# Patient Record
Sex: FEMALE | Race: WHITE | NOT HISPANIC OR LATINO | Employment: UNEMPLOYED | URBAN - NONMETROPOLITAN AREA
[De-identification: names, ages, dates, MRNs, and addresses within clinical notes are randomized per-mention and may not be internally consistent; named-entity substitution may affect disease eponyms.]

---

## 2017-05-18 VITALS
WEIGHT: 228 LBS | HEIGHT: 64 IN | SYSTOLIC BLOOD PRESSURE: 124 MMHG | BODY MASS INDEX: 38.93 KG/M2 | DIASTOLIC BLOOD PRESSURE: 68 MMHG

## 2017-05-24 ENCOUNTER — INITIAL PRENATAL (OUTPATIENT)
Dept: OBSTETRICS AND GYNECOLOGY | Facility: CLINIC | Age: 32
End: 2017-05-24

## 2017-05-24 VITALS — BODY MASS INDEX: 39.48 KG/M2 | WEIGHT: 230 LBS | DIASTOLIC BLOOD PRESSURE: 64 MMHG | SYSTOLIC BLOOD PRESSURE: 126 MMHG

## 2017-05-24 DIAGNOSIS — Z34.90 PREGNANCY, UNSPECIFIED GESTATIONAL AGE: Primary | ICD-10-CM

## 2017-05-24 PROBLEM — I10 CHRONIC HYPERTENSION: Status: ACTIVE | Noted: 2017-05-24

## 2017-05-24 PROBLEM — I10 CHRONIC HYPERTENSION: Status: RESOLVED | Noted: 2017-05-24 | Resolved: 2017-05-24

## 2017-05-24 PROCEDURE — 99204 OFFICE O/P NEW MOD 45 MIN: CPT | Performed by: NURSE PRACTITIONER

## 2017-05-24 RX ORDER — TIZANIDINE 4 MG/1
4 TABLET ORAL NIGHTLY PRN
COMMUNITY
End: 2017-07-03

## 2017-05-24 RX ORDER — BUPRENORPHINE HYDROCHLORIDE AND NALOXONE HYDROCHLORIDE DIHYDRATE 8; 2 MG/1; MG/1
TABLET SUBLINGUAL
COMMUNITY
Start: 2016-02-22 | End: 2017-06-05 | Stop reason: ALTCHOICE

## 2017-05-24 RX ORDER — PRENATAL WITH FERROUS FUM AND FOLIC ACID 3080; 920; 120; 400; 22; 1.84; 3; 20; 10; 1; 12; 200; 27; 25; 2 [IU]/1; [IU]/1; MG/1; [IU]/1; MG/1; MG/1; MG/1; MG/1; MG/1; MG/1; UG/1; MG/1; MG/1; MG/1; MG/1
1 TABLET ORAL DAILY
Qty: 30 TABLET | Refills: 4 | Status: SHIPPED | OUTPATIENT
Start: 2017-05-24 | End: 2017-06-23

## 2017-05-24 RX ORDER — LAMOTRIGINE 100 MG/1
1.5 TABLET ORAL DAILY
COMMUNITY
Start: 2016-01-07 | End: 2017-07-03

## 2017-05-24 RX ORDER — PANTOPRAZOLE SODIUM 40 MG/1
TABLET, DELAYED RELEASE ORAL
COMMUNITY
Start: 2016-02-23 | End: 2017-06-05 | Stop reason: ALTCHOICE

## 2017-05-24 RX ORDER — QUETIAPINE FUMARATE 100 MG/1
TABLET, FILM COATED ORAL DAILY
COMMUNITY
Start: 2016-01-07

## 2017-05-24 RX ORDER — GABAPENTIN 600 MG/1
TABLET ORAL 4 TIMES DAILY
COMMUNITY
Start: 2016-02-09 | End: 2017-06-05

## 2017-05-24 RX ORDER — CLONIDINE HYDROCHLORIDE 0.2 MG/1
TABLET ORAL 2 TIMES DAILY
COMMUNITY
Start: 2015-12-18 | End: 2017-07-03

## 2017-05-24 RX ORDER — RANITIDINE 150 MG/1
150 TABLET ORAL 2 TIMES DAILY
Qty: 60 TABLET | Refills: 2 | Status: SHIPPED | OUTPATIENT
Start: 2017-05-24 | End: 2017-06-19 | Stop reason: SDUPTHER

## 2017-05-24 RX ORDER — LISINOPRIL 20 MG/1
20 TABLET ORAL DAILY
COMMUNITY
End: 2017-06-05

## 2017-05-24 RX ORDER — PROMETHAZINE HYDROCHLORIDE 25 MG/1
TABLET ORAL
COMMUNITY
Start: 2016-01-07 | End: 2017-06-05 | Stop reason: SDUPTHER

## 2017-05-25 PROBLEM — R76.8 HEPATITIS C ANTIBODY TEST POSITIVE: Status: ACTIVE | Noted: 2017-05-25

## 2017-05-25 LAB
ABO GROUP BLD: (no result)
ALBUMIN SERPL-MCNC: 4.5 G/DL (ref 3.5–5)
ALP SERPL-CCNC: 47 U/L (ref 38–126)
ALT SERPL-CCNC: 21 U/L (ref 13–69)
AST SERPL-CCNC: 18 U/L (ref 15–46)
BASOPHILS # BLD AUTO: 0 X10E3/UL (ref 0–0.2)
BASOPHILS NFR BLD AUTO: 0 %
BILIRUB DIRECT SERPL-MCNC: 0.4 MG/DL (ref 0–0.4)
BILIRUB SERPL-MCNC: 0.5 MG/DL (ref 0.2–1.3)
BLD GP AB SCN SERPL QL: NEGATIVE
EOSINOPHIL # BLD AUTO: 0.1 X10E3/UL (ref 0–0.4)
EOSINOPHIL NFR BLD AUTO: 1 %
ERYTHROCYTE [DISTWIDTH] IN BLOOD BY AUTOMATED COUNT: 15.6 % (ref 12.3–15.4)
HBV SURFACE AG SERPL QL IA: NEGATIVE
HCT VFR BLD AUTO: 38.3 % (ref 34–46.6)
HCV AB S/CO SERPL IA: 8.7 S/CO RATIO (ref 0–0.9)
HGB BLD-MCNC: 12.3 G/DL (ref 11.1–15.9)
HIV 1+2 AB+HIV1 P24 AG SERPL QL IA: NON REACTIVE
IMM GRANULOCYTES # BLD: 0 X10E3/UL (ref 0–0.1)
IMM GRANULOCYTES NFR BLD: 0 %
LYMPHOCYTES # BLD AUTO: 3.1 X10E3/UL (ref 0.7–3.1)
LYMPHOCYTES NFR BLD AUTO: 28 %
MCH RBC QN AUTO: 28.6 PG (ref 26.6–33)
MCHC RBC AUTO-ENTMCNC: 32.1 G/DL (ref 31.5–35.7)
MCV RBC AUTO: 89 FL (ref 79–97)
MONOCYTES # BLD AUTO: 0.6 X10E3/UL (ref 0.1–0.9)
MONOCYTES NFR BLD AUTO: 5 %
NEUTROPHILS # BLD AUTO: 7.5 X10E3/UL (ref 1.4–7)
NEUTROPHILS NFR BLD AUTO: 66 %
PLATELET # BLD AUTO: 273 X10E3/UL (ref 150–379)
PROT SERPL-MCNC: 7.5 G/DL (ref 6.3–8.2)
RBC # BLD AUTO: 4.3 X10E6/UL (ref 3.77–5.28)
RH BLD: NEGATIVE
RPR SER QL: NON REACTIVE
RUBV IGG SERPL IA-ACNC: 1.85 INDEX
WBC # BLD AUTO: 11.3 X10E3/UL (ref 3.4–10.8)

## 2017-06-05 ENCOUNTER — ROUTINE PRENATAL (OUTPATIENT)
Dept: OBSTETRICS AND GYNECOLOGY | Facility: CLINIC | Age: 32
End: 2017-06-05

## 2017-06-05 VITALS — WEIGHT: 236 LBS | SYSTOLIC BLOOD PRESSURE: 128 MMHG | DIASTOLIC BLOOD PRESSURE: 70 MMHG | BODY MASS INDEX: 40.51 KG/M2

## 2017-06-05 DIAGNOSIS — Z3A.10 10 WEEKS GESTATION OF PREGNANCY: Primary | ICD-10-CM

## 2017-06-05 DIAGNOSIS — K21.9 GASTROESOPHAGEAL REFLUX DISEASE WITHOUT ESOPHAGITIS: ICD-10-CM

## 2017-06-05 DIAGNOSIS — O99.321 SUBSTANCE ABUSE AFFECTING PREGNANCY IN FIRST TRIMESTER, ANTEPARTUM: ICD-10-CM

## 2017-06-05 DIAGNOSIS — O16.1 HYPERTENSION AFFECTING PREGNANCY IN FIRST TRIMESTER: ICD-10-CM

## 2017-06-05 DIAGNOSIS — F31.9 BIPOLAR DISEASE DURING PREGNANCY IN FIRST TRIMESTER (HCC): ICD-10-CM

## 2017-06-05 DIAGNOSIS — R76.8 HEPATITIS C ANTIBODY TEST POSITIVE: ICD-10-CM

## 2017-06-05 DIAGNOSIS — O99.341 BIPOLAR DISEASE DURING PREGNANCY IN FIRST TRIMESTER (HCC): ICD-10-CM

## 2017-06-05 PROCEDURE — 99213 OFFICE O/P EST LOW 20 MIN: CPT | Performed by: OBSTETRICS & GYNECOLOGY

## 2017-06-05 RX ORDER — PROMETHAZINE HYDROCHLORIDE 25 MG/1
25 TABLET ORAL EVERY 6 HOURS PRN
Qty: 30 TABLET | Refills: 0 | Status: SHIPPED | OUTPATIENT
Start: 2017-06-05

## 2017-06-05 RX ORDER — BUPRENORPHINE HYDROCHLORIDE 8 MG/1
16 TABLET SUBLINGUAL DAILY
COMMUNITY
Start: 2017-05-29

## 2017-06-08 ENCOUNTER — TELEPHONE (OUTPATIENT)
Dept: OBSTETRICS AND GYNECOLOGY | Facility: CLINIC | Age: 32
End: 2017-06-08

## 2017-06-08 NOTE — TELEPHONE ENCOUNTER
----- Message from Mariam Dhillon sent at 6/8/2017  1:45 PM EDT -----  Contact: PT  PT SAW DR OSORIO THE OTHER DAY, AND HER PRENATAL GUMMIES DIDN'T GO THROUGH TO [facility] IN [facility].  PLEASE RESEND RX.  THANKS

## 2017-06-19 ENCOUNTER — ROUTINE PRENATAL (OUTPATIENT)
Dept: OBSTETRICS AND GYNECOLOGY | Facility: CLINIC | Age: 32
End: 2017-06-19

## 2017-06-19 VITALS — BODY MASS INDEX: 39.82 KG/M2 | WEIGHT: 232 LBS | SYSTOLIC BLOOD PRESSURE: 144 MMHG | DIASTOLIC BLOOD PRESSURE: 70 MMHG

## 2017-06-19 DIAGNOSIS — Z3A.13 13 WEEKS GESTATION OF PREGNANCY: Primary | ICD-10-CM

## 2017-06-19 PROCEDURE — 99212 OFFICE O/P EST SF 10 MIN: CPT | Performed by: OBSTETRICS & GYNECOLOGY

## 2017-06-19 RX ORDER — RANITIDINE 150 MG/1
150 TABLET ORAL 2 TIMES DAILY
Qty: 60 TABLET | Refills: 12 | Status: SHIPPED | OUTPATIENT
Start: 2017-06-19 | End: 2018-06-19

## 2017-06-19 RX ORDER — PROMETHAZINE HYDROCHLORIDE 25 MG/1
TABLET ORAL
COMMUNITY
Start: 2017-01-19 | End: 2017-06-19

## 2017-06-19 RX ORDER — PRENATAL VIT/IRON FUM/FOLIC AC 27MG-0.8MG
TABLET ORAL
COMMUNITY
Start: 2017-06-08 | End: 2017-06-19

## 2017-06-19 NOTE — PATIENT INSTRUCTIONS
Prenatal Care  WHAT IS PRENATAL CARE?   Prenatal care is the process of caring for a pregnant woman before she gives birth. Prenatal care makes sure that she and her baby remain as healthy as possible throughout pregnancy. Prenatal care may be provided by a midwife, family practice health care provider, or a childbirth and pregnancy specialist (obstetrician). Prenatal care may include physical examinations, testing, treatments, and education on nutrition, lifestyle, and social support services.  WHY IS PRENATAL CARE SO IMPORTANT?   Early and consistent prenatal care increases the chance that you and your baby will remain healthy throughout your pregnancy. This type of care also decreases a baby's risk of being born too early (prematurely), or being born smaller than expected (small for gestational age). Any underlying medical conditions you may have that could pose a risk during your pregnancy are discussed during prenatal care visits. You will also be monitored regularly for any new conditions that may arise during your pregnancy so they can be treated quickly and effectively.  WHAT HAPPENS DURING PRENATAL CARE VISITS?  Prenatal care visits may include the following:  Discussion  Tell your health care provider about any new signs or symptoms you have experienced since your last visit. These might include:  · Nausea or vomiting.  · Increased or decreased level of energy.  · Difficulty sleeping.  · Back or leg pain.  · Weight changes.  · Frequent urination.  · Shortness of breath with physical activity.  · Changes in your skin, such as the development of a rash or itchiness.  · Vaginal discharge or bleeding.  · Feelings of excitement or nervousness.  · Changes in your baby's movements.  You may want to write down any questions or topics you want to discuss with your health care provider and bring them with you to your appointment.  Examination  During your first prenatal care visit, you will likely have a complete  physical exam. Your health care provider will often examine your vagina, cervix, and the position of your uterus, as well as check your heart, lungs, and other body systems. As your pregnancy progresses, your health care provider will measure the size of your uterus and your baby's position inside your uterus. He or she may also examine you for early signs of labor. Your prenatal visits may also include checking your blood pressure and, after about 10-12 weeks of pregnancy, listening to your baby's heartbeat.  Testing  Regular testing often includes:  · Urinalysis. This checks your urine for glucose, protein, or signs of infection.  · Blood count. This checks the levels of white and red blood cells in your body.  · Tests for sexually transmitted infections (STIs). Testing for STIs at the beginning of pregnancy is routinely done and is required in many states.  · Antibody testing. You will be checked to see if you are immune to certain illnesses, such as rubella, that can affect a developing fetus.  · Glucose screen. Around 24-28 weeks of pregnancy, your blood glucose level will be checked for signs of gestational diabetes. Follow-up tests may be recommended.  · Group B strep. This is a bacteria that is commonly found inside a woman's vagina. This test will inform your health care provider if you need an antibiotic to reduce the amount of this bacteria in your body prior to labor and childbirth.  · Ultrasound. Many pregnant women undergo an ultrasound screening around 18-20 weeks of pregnancy to evaluate the health of the fetus and check for any developmental abnormalities.  · HIV (human immunodeficiency virus) testing. Early in your pregnancy, you will be screened for HIV. If you are at high risk for HIV, this test may be repeated during your third trimester of pregnancy.  You may be offered other testing based on your age, personal or family medical history, or other factors.   HOW OFTEN SHOULD I PLAN TO SEE MY  HEALTH CARE PROVIDER FOR PRENATAL CARE?  Your prenatal care check-up schedule depends on any medical conditions you have before, or develop during, your pregnancy. If you do not have any underlying medical conditions, you will likely be seen for checkups:  · Monthly, during the first 6 months of pregnancy.  · Twice a month during months 7 and 8 of pregnancy.  · Weekly starting in the 9th month of pregnancy and until delivery.  If you develop signs of early labor or other concerning signs or symptoms, you may need to see your health care provider more often. Ask your health care provider what prenatal care schedule is best for you.  WHAT CAN I DO TO KEEP MYSELF AND MY BABY AS HEALTHY AS POSSIBLE DURING MY PREGNANCY?  · Take a prenatal vitamin containing 400 micrograms (0.4 mg) of folic acid every day. Your health care provider may also ask you to take additional vitamins such as iodine, vitamin D, iron, copper, and zinc.  · Take 1842-8704 mg of calcium daily starting at your 20th week of pregnancy until you deliver your baby.  · Make sure you are up to date on your vaccinations. Unless directed otherwise by your health care provider:    You should receive a tetanus, diphtheria, and pertussis (Tdap) vaccination between the 27th and 36th week of your pregnancy, regardless of when your last Tdap immunization occurred. This helps protect your baby from whooping cough (pertussis) after he or she is born.    You should receive an annual inactivated influenza vaccine (IIV) to help protect you and your baby from influenza. This can be done at any point during your pregnancy.  · Eat a well-rounded diet that includes:    Fresh fruits and vegetables.    Lean proteins.    Calcium-rich foods such as milk, yogurt, hard cheeses, and dark, leafy greens.    Whole grain breads.  · Do not eat seafood high in mercury, including:    Swordfish.    Tilefish.    Shark.    Damir mackerel.    More than 6 oz tuna per week.  · Do not eat:    Raw  or undercooked meats or eggs.    Unpasteurized foods, such as soft cheeses (brie, blue, or feta), juices, and milks.    Lunch meats.    Hot dogs that have not been heated until they are steaming.  · Drink enough water to keep your urine clear or pale yellow. For many women, this may be 10 or more 8 oz glasses of water each day. Keeping yourself hydrated helps deliver nutrients to your baby and may prevent the start of pre-term uterine contractions.  · Do not use any tobacco products including cigarettes, chewing tobacco, or electronic cigarettes. If you need help quitting, ask your health care provider.  · Do not drink beverages containing alcohol. No safe level of alcohol consumption during pregnancy has been determined.  · Do not use any illegal drugs. These can harm your developing baby or cause a miscarriage.  · Ask your health care provider or pharmacist before taking any prescription or over-the-counter medicines, herbs, or supplements.  · Limit your caffeine intake to no more than 200 mg per day.  · Exercise. Unless told otherwise by your health care provider, try to get 30 minutes of moderate exercise most days of the week. Do not  do high-impact activities, contact sports, or activities with a high risk of falling, such as horseback riding or downhill skiing.  · Get plenty of rest.  · Avoid anything that raises your body temperature, such as hot tubs and saunas.  · If you own a cat, do not empty its litter box. Bacteria contained in cat feces can cause an infection called toxoplasmosis. This can result in serious harm to the fetus.  · Stay away from chemicals such as insecticides, lead, mercury, and cleaning or paint products that contain solvents.  · Do not have any X-rays taken unless medically necessary.  · Take a childbirth and breastfeeding preparation class. Ask your health care provider if you need a referral or recommendation.     This information is not intended to replace advice given to you by  your health care provider. Make sure you discuss any questions you have with your health care provider.     Document Released: 12/20/2004 Document Revised: 04/10/2017 Document Reviewed: 03/04/2015  Elsevier Interactive Patient Education ©2017 [employer]

## 2017-06-19 NOTE — PROGRESS NOTES
Chief Complaint   Patient presents with   • Routine Prenatal Visit     No Complaints, Dating Ultrasound        HPI:   , 13w3d gestation reports stable NVP, TVS WNL 13w3d, normla adnexa    ROS:  See Prenatal Episode/Flowsheet  /70  Wt 232 lb (105 kg)  LMP 2017  BMI 39.82 kg/m2     EXAM:  EXTREMITIES:  No swelling-See Prenatal Episode/Flowsheet    ABDOMEN:  FHTs/Movement noted-See Prenatal Episode/Flowsheet    URINE GLUCOSE/PROTEIN:  See Prenatal Episode/Flowsheet    PELVIC EXAM:  See Prenatal Episode/Flowsheet    MDM:    Lab Results   Component Value Date    HGB 12.3 2017    HEPBSAG Negative 2017    ABO A 2017    RH Negative 2017    ABSCRN Negative 2017    HEPCVIRUSABY 8.7 (H) 2017       Had long discussion of medsd and poyypharmacy, patient states she has to take lamictala nd patient has long term bipolar and what sounds like PTSD from Brothers death. Risks and benefits were discussed at length category C including risk of cleft lip and palate with Lamictal as well as unknown variables with the Seroquel.  This time given patient's significant mental health issues would not recommend discontinuing these 2 medicines.  However discussed until patient she is to discontinue the clonidine and the Zanaflex which she has not done yet.

## 2017-07-03 ENCOUNTER — ROUTINE PRENATAL (OUTPATIENT)
Dept: OBSTETRICS AND GYNECOLOGY | Facility: CLINIC | Age: 32
End: 2017-07-03

## 2017-07-03 VITALS — BODY MASS INDEX: 41.02 KG/M2 | SYSTOLIC BLOOD PRESSURE: 126 MMHG | DIASTOLIC BLOOD PRESSURE: 60 MMHG | WEIGHT: 239 LBS

## 2017-07-03 DIAGNOSIS — Z3A.18 18 WEEKS GESTATION OF PREGNANCY: Primary | ICD-10-CM

## 2017-07-03 PROCEDURE — 99214 OFFICE O/P EST MOD 30 MIN: CPT | Performed by: NURSE PRACTITIONER

## 2017-07-03 RX ORDER — PRENATAL VIT NO.126/IRON/FOLIC 28MG-0.8MG
TABLET ORAL DAILY
COMMUNITY

## 2017-07-03 RX ORDER — QUETIAPINE FUMARATE 50 MG/1
TABLET, FILM COATED ORAL
COMMUNITY
Start: 2017-06-23

## 2017-07-03 RX ORDER — AMOXICILLIN 500 MG/1
CAPSULE ORAL
COMMUNITY
Start: 2017-06-23 | End: 2017-08-14

## 2017-07-03 NOTE — PATIENT INSTRUCTIONS
Pregnancy and Smoking  Smoking during pregnancy is unhealthy for you and your developing baby. The addictive drug nicotine, carbon monoxide, and many other poisons are inhaled from a cigarette and carried through your bloodstream to your baby. Cigarette smoke contains more than 2,500 chemicals. It is not known which of these are harmful to a developing baby. However, both nicotine and carbon monoxide play a role in causing health problems in pregnancy.  Smoking during pregnancy increases the risk of:  · Birth defects in your baby, including heart defects.  · Miscarriage and stillbirth.  · Birth before 37 completed weeks of pregnancy (premature birth).  · Pregnancy outside of the uterus (tubal pregnancy).  · Attachment of the placenta over the opening of the uterus (placenta previa).  · Detachment of the placenta before the baby's birth (placental abruption).  · Breaking of the bag of abarca before labor begins (premature rupture of membranes).  HOW DOES SMOKING DURING PREGNANCY AFFECT MY BABY?  Before Birth  Smoking during pregnancy:  · Decreases blood flow and oxygen to your baby.  · Increases the heart rate of your baby.  · Slows your baby's growth in the uterus (intrauterine growth retardation).  After Birth  Babies born to women who smoke during pregnancy are more likely to have a low birth weight. They are also at risk for:  · Serious health problems, chronic or lifelong disabilities (cerebral palsy, mental retardation, learning problems), and death.  · Sudden infant death syndrome (SIDS).  · Lung and breathing problems.  WHAT RESOURCES ARE AVAILABLE TO HELP ME STOP SMOKING?   Ask your health care provider for help to stop smoking. The following resources are available:  · Counseling.  · Psychological treatment.  · Acupuncture.  · Family intervention.  · Hypnosis.  · Nicotine supplements have not been studied enough to know if they are safe to use during pregnancy. They should only be considered when all other  methods fail, and if used under the close supervision of your health care provider.  · Telephone QUIT lines. The national smoking cessation telephone hotline number is 9-337-QUIT NOW.  FOR MORE INFORMATION  · American Cancer Society: www.cancer.org  · American Heart Association: www.heart.org  · National Cancer [place]: www.cancer.gov  · March of [employer]: www.marchofdimes.org     This information is not intended to replace advice given to you by your health care provider. Make sure you discuss any questions you have with your health care provider.

## 2017-07-03 NOTE — PROGRESS NOTES
Chief Complaint   Patient presents with   • Routine Prenatal Visit     no complaints         HPI  , 15w3d reports taking meds as previously noted with Dr. Cheng - did stop lamictal - able to notice as having more mood swings - no suicidal ideology.    ROS  /60  Wt 239 lb (108 kg)  LMP 2017  BMI 41.02 kg/m2 -See Prenatal Assessment    EXAM  Constitutional:  No apparent distress   HEENT:  Heart/Lungs:  Abdomen:  Fundal ht/ FHT's / FM see prenatal flow sheet  Extremeties:    V/E:     MDM  Impression: Supervision of pregnancy   Hx of substance abuse including heroine / + subutex at present  Meds early pregnancy include lisinopril, zanaflex, seroquel clonidine, lamictal  Bipolar   + Hepatitis C   Tobacco use in pregnancy  hx  labor x 2 < 35 wks 2 at 35 wks per pt report   Tests done today: none   Topics discussed: Tobacco use  Only to take meds as prescribed - option for maternal 4   Schedule appt with [facility] as previously suggested earlier visit     Tests next visit: none

## 2017-07-06 PROBLEM — F31.9 BIPOLAR 1 DISORDER (HCC): Status: ACTIVE | Noted: 2017-07-06

## 2017-07-24 ENCOUNTER — APPOINTMENT (OUTPATIENT)
Dept: WOMENS IMAGING | Facility: HOSPITAL | Age: 32
End: 2017-07-24

## 2017-07-26 ENCOUNTER — OFFICE VISIT (OUTPATIENT)
Dept: OBSTETRICS AND GYNECOLOGY | Facility: HOSPITAL | Age: 32
End: 2017-07-26

## 2017-07-26 ENCOUNTER — HOSPITAL ENCOUNTER (OUTPATIENT)
Dept: WOMENS IMAGING | Facility: HOSPITAL | Age: 32
Discharge: HOME | End: 2017-07-26
Admitting: NURSE PRACTITIONER

## 2017-07-26 VITALS — DIASTOLIC BLOOD PRESSURE: 67 MMHG | BODY MASS INDEX: 41.2 KG/M2 | WEIGHT: 240 LBS | SYSTOLIC BLOOD PRESSURE: 115 MMHG

## 2017-07-26 DIAGNOSIS — R76.8 HEPATITIS C ANTIBODY TEST POSITIVE: Primary | ICD-10-CM

## 2017-07-26 DIAGNOSIS — O10.919 CHRONIC HYPERTENSION DURING PREGNANCY, ANTEPARTUM: ICD-10-CM

## 2017-07-26 DIAGNOSIS — F31.9 BIPOLAR 1 DISORDER (HCC): ICD-10-CM

## 2017-07-26 DIAGNOSIS — Z3A.18 18 WEEKS GESTATION OF PREGNANCY: ICD-10-CM

## 2017-07-26 PROCEDURE — 76811 OB US DETAILED SNGL FETUS: CPT | Performed by: OBSTETRICS & GYNECOLOGY

## 2017-07-26 PROCEDURE — 99241 PR OFFICE CONSULTATION NEW/ESTAB PATIENT 15 MIN: CPT | Performed by: OBSTETRICS & GYNECOLOGY

## 2017-07-26 PROCEDURE — 76811 OB US DETAILED SNGL FETUS: CPT

## 2017-07-26 NOTE — PROGRESS NOTES
Documentation of the ultrasound findings, images, and interpretations will be available in the patient's ViewPoint report located in the chart review imaging tab in [facility].

## 2017-08-14 ENCOUNTER — ROUTINE PRENATAL (OUTPATIENT)
Dept: OBSTETRICS AND GYNECOLOGY | Facility: CLINIC | Age: 32
End: 2017-08-14

## 2017-08-14 VITALS — WEIGHT: 243 LBS | DIASTOLIC BLOOD PRESSURE: 66 MMHG | BODY MASS INDEX: 41.71 KG/M2 | SYSTOLIC BLOOD PRESSURE: 126 MMHG

## 2017-08-14 DIAGNOSIS — O10.919 CHRONIC HYPERTENSION DURING PREGNANCY, ANTEPARTUM: ICD-10-CM

## 2017-08-14 DIAGNOSIS — R76.8 HEPATITIS C ANTIBODY TEST POSITIVE: ICD-10-CM

## 2017-08-14 DIAGNOSIS — Z3A.21 21 WEEKS GESTATION OF PREGNANCY: Primary | ICD-10-CM

## 2017-08-14 DIAGNOSIS — K59.00 CONSTIPATION, UNSPECIFIED CONSTIPATION TYPE: ICD-10-CM

## 2017-08-14 DIAGNOSIS — F31.9 BIPOLAR 1 DISORDER (HCC): ICD-10-CM

## 2017-08-14 DIAGNOSIS — Z87.51 HISTORY OF PRETERM DELIVERY: ICD-10-CM

## 2017-08-14 PROCEDURE — 99213 OFFICE O/P EST LOW 20 MIN: CPT | Performed by: OBSTETRICS & GYNECOLOGY

## 2017-08-14 RX ORDER — PROMETHAZINE HYDROCHLORIDE 25 MG/1
25 TABLET ORAL EVERY 6 HOURS PRN
Qty: 30 TABLET | Refills: 3 | Status: SHIPPED | OUTPATIENT
Start: 2017-08-14

## 2017-08-14 RX ORDER — DOCUSATE SODIUM 100 MG/1
100 CAPSULE, LIQUID FILLED ORAL 2 TIMES DAILY
Qty: 60 CAPSULE | Refills: 6 | Status: SHIPPED | OUTPATIENT
Start: 2017-08-14 | End: 2018-08-14

## 2017-08-14 RX ORDER — POLYETHYLENE GLYCOL 3350 17 G/17G
POWDER, FOR SOLUTION ORAL
COMMUNITY
Start: 2017-07-17

## 2017-08-14 NOTE — PROGRESS NOTES
Chief Complaint   Patient presents with   • Routine Prenatal Visit     Patient advised seen in ER last night for constipation.        HPI: Zulma is a  currently at 21w3d who today reports the following:  Contractions - No; Leaking - No; Vaginal bleeding -  No; Heartburn - No.  Pt seen in ER last night Uday Moss; told to take Miralax.  Pt had not had bm x 2 weeks but had one in ER.  Pt also with cont nausea; requesting refill of phenergan.  Pt also seen at [facility] last visit; report reviewed.  Pt to have repeat scan in 8 weeks.  Pt also to start Ruch injections for history of  delivery.  Pt also started on baby ASA.  ROS:   GI:   Nausea - YES; Constipation - YES; Diarrhea - No.   Neuro:  Headache - No; Visual disturbances - No.    EXAM:   Vitals:  See prenatal flowsheet as noted and reviewed   Abdomen:   See prenatal flowsheet as noted and reviewed   Pelvic:  See prenatal flowsheet as noted and reviewed   Urine:  See prenatal flowsheet as noted and reviewed     Lab Results   Component Value Date    ABO A 2017    RH Negative 2017    ABSCRN Negative 2017       MDM:  Impression: Supervision of pregnancy  Constipation   HCV  History of  delivery  Nausea and emesis of pregnancy  Chronic HTN   Tests done today: none   [place] in process of getting approval; will call when available  Rx Phenergan given  Rx Colace given   Topics discussed: kick counts and fetal movement   labor signs and symptoms   Increase po fluids  Instructions and precautions given   Tests next visit: none     This note was electronically signed.  Preethi Land M.D.

## 2017-08-21 ENCOUNTER — ROUTINE PRENATAL (OUTPATIENT)
Dept: OBSTETRICS AND GYNECOLOGY | Facility: CLINIC | Age: 32
End: 2017-08-21

## 2017-08-21 VITALS — BODY MASS INDEX: 41.54 KG/M2 | SYSTOLIC BLOOD PRESSURE: 128 MMHG | DIASTOLIC BLOOD PRESSURE: 70 MMHG | WEIGHT: 242 LBS

## 2017-08-21 DIAGNOSIS — O10.919 CHRONIC HYPERTENSION DURING PREGNANCY, ANTEPARTUM: Primary | ICD-10-CM

## 2017-08-21 PROCEDURE — 96372 THER/PROPH/DIAG INJ SC/IM: CPT | Performed by: OBSTETRICS & GYNECOLOGY

## 2017-08-21 PROCEDURE — 99213 OFFICE O/P EST LOW 20 MIN: CPT | Performed by: OBSTETRICS & GYNECOLOGY

## 2017-08-21 RX ORDER — HYDROXYPROGESTERONE CAPROATE 250 MG/ML
250 INJECTION INTRAMUSCULAR
Status: SHIPPED | OUTPATIENT
Start: 2017-08-21

## 2017-08-21 RX ORDER — LAMOTRIGINE 150 MG/1
TABLET ORAL
COMMUNITY
Start: 2017-08-16 | End: 2017-08-21

## 2017-08-21 RX ADMIN — HYDROXYPROGESTERONE CAPROATE 250 MG: 250 INJECTION INTRAMUSCULAR at 15:55

## 2017-08-21 NOTE — PROGRESS NOTES
Chief Complaint   Patient presents with   • Routine Prenatal Visit     No Complaints        HPI:   , 22w3d gestation reports doing well    ROS:  See Prenatal Episode/Flowsheet  /70  Wt 242 lb (110 kg)  LMP 2017 (Approximate)  BMI 41.54 kg/m2     EXAM:  EXTREMITIES:  No swelling-See Prenatal Episode/Flowsheet    ABDOMEN:  FHTs/Movement noted-See Prenatal Episode/Flowsheet    URINE GLUCOSE/PROTEIN:  See Prenatal Episode/Flowsheet    PELVIC EXAM:  See Prenatal Episode/Flowsheet    MDM:    Lab Results   Component Value Date    HGB 12.3 2017    HEPBSAG Negative 2017    ABO A 2017    RH Negative 2017    ABSCRN Negative 2017    HEPCVIRUSABY 8.7 (H) 2017       Dietary mods, Gluocla next time, Starting Hernan today (delivered earliest 31 weeks, 35 weeks twice)

## 2017-08-21 NOTE — PATIENT INSTRUCTIONS
Hypertension During Pregnancy  Hypertension, or high blood pressure, is when there is extra pressure inside your blood vessels that carry blood from the heart to the rest of your body (arteries). It can happen at any time in life, including pregnancy. Hypertension during pregnancy can cause problems for you and your baby. Your baby might not weigh as much as he or she should at birth or might be born early (premature). Very bad cases of hypertension during pregnancy can be life-threatening.   Different types of hypertension can occur during pregnancy. These include:  · Chronic hypertension. This happens when a woman has hypertension before pregnancy and it continues during pregnancy.  · Gestational hypertension. This is when hypertension develops during pregnancy.  · Preeclampsia or toxemia of pregnancy. This is a very serious type of hypertension that develops only during pregnancy. It affects the whole body and can be very dangerous for both mother and baby.    Gestational hypertension and preeclampsia usually go away after your baby is born. Your blood pressure will likely stabilize within 6 weeks. Women who have hypertension during pregnancy have a greater chance of developing hypertension later in life or with future pregnancies.  RISK FACTORS  There are certain factors that make it more likely for you to develop hypertension during pregnancy. These include:  · Having hypertension before pregnancy.  · Having hypertension during a previous pregnancy.  · Being overweight.  · Being older than 40 years.  · Being pregnant with more than one baby.  · Having diabetes or kidney problems.  SIGNS AND SYMPTOMS  Chronic and gestational hypertension rarely cause symptoms. Preeclampsia has symptoms, which may include:  · Increased protein in your urine. Your health care provider will check for this at every prenatal visit.  · Swelling of your hands and face.  · Rapid weight gain.  · Headaches.  · Visual changes.  · Being  bothered by light.  · Abdominal pain, especially in the upper right area.  · Chest pain.  · Shortness of breath.  · Increased reflexes.  · Seizures. These occur with a more severe form of preeclampsia, called eclampsia.  DIAGNOSIS   You may be diagnosed with hypertension during a regular prenatal exam. At each prenatal visit, you may have:  · Your blood pressure checked.  · A urine test to check for protein in your urine.  The type of hypertension you are diagnosed with depends on when you developed it. It also depends on your specific blood pressure reading.  · Developing hypertension before 20 weeks of pregnancy is consistent with chronic hypertension.  · Developing hypertension after 20 weeks of pregnancy is consistent with gestational hypertension.  · Hypertension with increased urinary protein is diagnosed as preeclampsia.  · Blood pressure measurements that stay above 160 systolic or 110 diastolic are a sign of severe preeclampsia.  TREATMENT  Treatment for hypertension during pregnancy varies. Treatment depends on the type of hypertension and how serious it is.  · If you take medicine for chronic hypertension, you may need to switch medicines.    Medicines called ACE inhibitors should not be taken during pregnancy.    Low-dose aspirin may be suggested for women who have risk factors for preeclampsia.  · If you have gestational hypertension, you may need to take a blood pressure medicine that is safe during pregnancy. Your health care provider will recommend the correct medicine.  · If you have severe preeclampsia, you may need to be in the hospital. Health care providers will watch you and your baby very closely. You also may need to take medicine called magnesium sulfate to prevent seizures and lower blood pressure.  · Sometimes, an early delivery is needed. This may be the case if the condition worsens. It would be done to protect you and your baby. The only cure for preeclampsia is delivery.  · Your health  care provider may recommend that you take one low-dose aspirin (81 mg) each day to help prevent high blood pressure during your pregnancy if you are at risk for preeclampsia. You may be at risk for preeclampsia if:    You had preeclampsia or eclampsia during a previous pregnancy.    Your baby did not grow as expected during a previous pregnancy.    You experienced  birth with a previous pregnancy.    You experienced a separation of the placenta from the uterus (placental abruption) during a previous pregnancy.    You experienced the loss of your baby during a previous pregnancy.    You are pregnant with more than one baby.    You have other medical conditions, such as diabetes or an autoimmune disease.  HOME CARE INSTRUCTIONS  · Schedule and keep all of your regular prenatal care appointments. This is important.  · Take medicines only as directed by your health care provider. Tell your health care provider about all medicines you take.  · Eat as little salt as possible.  · Get regular exercise.  · Do not drink alcohol.  · Do not use tobacco products.  · Do not drink products with caffeine.  · Lie on your left side when resting.  SEEK IMMEDIATE MEDICAL CARE IF:  · You have severe abdominal pain.  · You have sudden swelling in your hands, ankles, or face.  · You gain 4 pounds (1.8 kg) or more in 1 week.  · You vomit repeatedly.  · You have vaginal bleeding.  · You do not feel your baby moving as much.  · You have a headache.  · You have blurred or double vision.  · You have muscle twitching or spasms.  · You have shortness of breath.  · You have blue fingernails or lips.  · You have blood in your urine.  MAKE SURE YOU:  · Understand these instructions.  · Will watch your condition.  · Will get help right away if you are not doing well or get worse.     This information is not intended to replace advice given to you by your health care provider. Make sure you discuss any questions you have with your health care  provider.     Document Released: 09/04/2012 Document Revised: 01/08/2016 Document Reviewed: 07/17/2014  [employer] Interactive Patient Education ©2017 [employer]  Hypertension During Pregnancy  Hypertension, or high blood pressure, is when there is extra pressure inside your blood vessels that carry blood from the heart to the rest of your body (arteries). It can happen at any time in life, including pregnancy. Hypertension during pregnancy can cause problems for you and your baby. Your baby might not weigh as much as he or she should at birth or might be born early (premature). Very bad cases of hypertension during pregnancy can be life-threatening.   Different types of hypertension can occur during pregnancy. These include:  · Chronic hypertension. This happens when a woman has hypertension before pregnancy and it continues during pregnancy.  · Gestational hypertension. This is when hypertension develops during pregnancy.  · Preeclampsia or toxemia of pregnancy. This is a very serious type of hypertension that develops only during pregnancy. It affects the whole body and can be very dangerous for both mother and baby.    Gestational hypertension and preeclampsia usually go away after your baby is born. Your blood pressure will likely stabilize within 6 weeks. Women who have hypertension during pregnancy have a greater chance of developing hypertension later in life or with future pregnancies.  RISK FACTORS  There are certain factors that make it more likely for you to develop hypertension during pregnancy. These include:  · Having hypertension before pregnancy.  · Having hypertension during a previous pregnancy.  · Being overweight.  · Being older than 40 years.  · Being pregnant with more than one baby.  · Having diabetes or kidney problems.  SIGNS AND SYMPTOMS  Chronic and gestational hypertension rarely cause symptoms. Preeclampsia has symptoms, which may include:  · Increased protein in your urine. Your  health care provider will check for this at every prenatal visit.  · Swelling of your hands and face.  · Rapid weight gain.  · Headaches.  · Visual changes.  · Being bothered by light.  · Abdominal pain, especially in the upper right area.  · Chest pain.  · Shortness of breath.  · Increased reflexes.  · Seizures. These occur with a more severe form of preeclampsia, called eclampsia.  DIAGNOSIS   You may be diagnosed with hypertension during a regular prenatal exam. At each prenatal visit, you may have:  · Your blood pressure checked.  · A urine test to check for protein in your urine.  The type of hypertension you are diagnosed with depends on when you developed it. It also depends on your specific blood pressure reading.  · Developing hypertension before 20 weeks of pregnancy is consistent with chronic hypertension.  · Developing hypertension after 20 weeks of pregnancy is consistent with gestational hypertension.  · Hypertension with increased urinary protein is diagnosed as preeclampsia.  · Blood pressure measurements that stay above 160 systolic or 110 diastolic are a sign of severe preeclampsia.  TREATMENT  Treatment for hypertension during pregnancy varies. Treatment depends on the type of hypertension and how serious it is.  · If you take medicine for chronic hypertension, you may need to switch medicines.    Medicines called ACE inhibitors should not be taken during pregnancy.    Low-dose aspirin may be suggested for women who have risk factors for preeclampsia.  · If you have gestational hypertension, you may need to take a blood pressure medicine that is safe during pregnancy. Your health care provider will recommend the correct medicine.  · If you have severe preeclampsia, you may need to be in the hospital. Health care providers will watch you and your baby very closely. You also may need to take medicine called magnesium sulfate to prevent seizures and lower blood pressure.  · Sometimes, an early  delivery is needed. This may be the case if the condition worsens. It would be done to protect you and your baby. The only cure for preeclampsia is delivery.  · Your health care provider may recommend that you take one low-dose aspirin (81 mg) each day to help prevent high blood pressure during your pregnancy if you are at risk for preeclampsia. You may be at risk for preeclampsia if:    You had preeclampsia or eclampsia during a previous pregnancy.    Your baby did not grow as expected during a previous pregnancy.    You experienced  birth with a previous pregnancy.    You experienced a separation of the placenta from the uterus (placental abruption) during a previous pregnancy.    You experienced the loss of your baby during a previous pregnancy.    You are pregnant with more than one baby.    You have other medical conditions, such as diabetes or an autoimmune disease.  HOME CARE INSTRUCTIONS  · Schedule and keep all of your regular prenatal care appointments. This is important.  · Take medicines only as directed by your health care provider. Tell your health care provider about all medicines you take.  · Eat as little salt as possible.  · Get regular exercise.  · Do not drink alcohol.  · Do not use tobacco products.  · Do not drink products with caffeine.  · Lie on your left side when resting.  SEEK IMMEDIATE MEDICAL CARE IF:  · You have severe abdominal pain.  · You have sudden swelling in your hands, ankles, or face.  · You gain 4 pounds (1.8 kg) or more in 1 week.  · You vomit repeatedly.  · You have vaginal bleeding.  · You do not feel your baby moving as much.  · You have a headache.  · You have blurred or double vision.  · You have muscle twitching or spasms.  · You have shortness of breath.  · You have blue fingernails or lips.  · You have blood in your urine.  MAKE SURE YOU:  · Understand these instructions.  · Will watch your condition.  · Will get help right away if you are not doing well or get  worse.     This information is not intended to replace advice given to you by your health care provider. Make sure you discuss any questions you have with your health care provider.     Document Released: 09/04/2012 Document Revised: 01/08/2016 Document Reviewed: 07/17/2014  [employer] Interactive Patient Education ©2017 [employer]

## 2017-08-28 ENCOUNTER — CLINICAL SUPPORT (OUTPATIENT)
Dept: OBSTETRICS AND GYNECOLOGY | Facility: CLINIC | Age: 32
End: 2017-08-28

## 2017-08-28 VITALS
DIASTOLIC BLOOD PRESSURE: 68 MMHG | SYSTOLIC BLOOD PRESSURE: 130 MMHG | HEIGHT: 65 IN | WEIGHT: 243 LBS | BODY MASS INDEX: 40.48 KG/M2

## 2017-08-28 PROCEDURE — 96372 THER/PROPH/DIAG INJ SC/IM: CPT | Performed by: OBSTETRICS & GYNECOLOGY

## 2017-08-28 RX ORDER — HYDROXYPROGESTERONE CAPROATE 250 MG/ML
250 INJECTION INTRAMUSCULAR
Status: SHIPPED | OUTPATIENT
Start: 2017-08-28

## 2017-08-28 RX ADMIN — HYDROXYPROGESTERONE CAPROATE 250 MG: 250 INJECTION INTRAMUSCULAR at 10:00

## 2017-09-18 ENCOUNTER — RESULTS ENCOUNTER (OUTPATIENT)
Dept: OBSTETRICS AND GYNECOLOGY | Facility: CLINIC | Age: 32
End: 2017-09-18

## 2017-09-18 DIAGNOSIS — O10.919 CHRONIC HYPERTENSION DURING PREGNANCY, ANTEPARTUM: ICD-10-CM

## 2017-09-26 ENCOUNTER — RESULTS ENCOUNTER (OUTPATIENT)
Dept: OBSTETRICS AND GYNECOLOGY | Facility: CLINIC | Age: 32
End: 2017-09-26

## 2017-09-26 DIAGNOSIS — O10.919 CHRONIC HYPERTENSION DURING PREGNANCY, ANTEPARTUM: ICD-10-CM

## 2018-04-12 ENCOUNTER — HOSPITAL ENCOUNTER (OUTPATIENT)
Age: 33
End: 2018-04-12
Payer: COMMERCIAL

## 2018-04-12 DIAGNOSIS — R53.83: Primary | ICD-10-CM

## 2018-04-12 DIAGNOSIS — Z13.0: ICD-10-CM

## 2018-04-12 DIAGNOSIS — Z79.899: ICD-10-CM

## 2018-04-12 DIAGNOSIS — L65.9: ICD-10-CM

## 2018-04-12 DIAGNOSIS — R07.89: ICD-10-CM

## 2018-04-12 LAB
ALBUMIN LEVEL: 3.5 GM/DL (ref 3.4–5)
ALBUMIN/GLOB SERPL: 0.9 {RATIO} (ref 1.1–1.8)
ALP ISO SERPL-ACNC: 59 U/L (ref 46–116)
ALT SERPLBLD-CCNC: 37 U/L (ref 12–78)
ANION GAP SERPL CALC-SCNC: 14 MEQ/L (ref 5–15)
AST SERPL QL: 26 U/L (ref 15–37)
BILIRUBIN,TOTAL: 0.2 MG/DL (ref 0.2–1)
BUN SERPL-MCNC: 12 MG/DL (ref 7–18)
CALCIUM SPEC-MCNC: 9.4 MG/DL (ref 8.5–10.1)
CHLORIDE SPEC-SCNC: 105 MMOL/L (ref 98–107)
CHOLEST SPEC-SCNC: 169 MG/DL (ref 140–200)
CO2 SERPL-SCNC: 28 MMOL/L (ref 21–32)
CREAT BLD-SCNC: 0.76 MG/DL (ref 0.55–1.02)
ESTIMATED GLOMERULAR FILT RATE: 89 ML/MIN (ref 60–?)
GFR (AFRICAN AMERICAN): 107 ML/MIN (ref 60–?)
GLOBULIN SER CALC-MCNC: 3.9 GM/DL (ref 1.3–3.2)
GLUCOSE: 81 MG/DL (ref 74–106)
HBA1C MFR BLD: 5.3 % (ref 0–7)
HCT VFR BLD CALC: 34.8 % (ref 37–47)
HDLC SERPL-MCNC: 40 MG/DL (ref 29–89)
HGB BLD-MCNC: 10.5 G/DL (ref 12.2–16.2)
MCHC RBC-ENTMCNC: 30.1 G/DL (ref 31.8–35.4)
MCV RBC: 80.6 FL (ref 81–99)
MEAN CORPUSCULAR HEMOGLOBIN: 24.3 PG (ref 27–31.2)
PLATELET # BLD: 265 K/MM3 (ref 142–424)
POTASSIUM: 4 MMOL/L (ref 3.5–5.1)
PROT SERPL-MCNC: 7.4 GM/DL (ref 6.4–8.2)
RBC # BLD AUTO: 4.32 M/MM3 (ref 4.2–5.4)
SODIUM SPEC-SCNC: 143 MMOL/L (ref 136–145)
T4 FREE SERPL-MCNC: 1.14 NG/DL (ref 0.76–1.46)
TRIGLYCERIDES: 83 MG/DL (ref 30–200)
TSH SERPL-ACNC: 2.82 UIU/ML (ref 0.36–3.74)
WBC # BLD AUTO: 6.6 K/MM3 (ref 4.8–10.8)

## 2018-04-12 PROCEDURE — 82652 VIT D 1 25-DIHYDROXY: CPT

## 2018-04-12 PROCEDURE — 83036 HEMOGLOBIN GLYCOSYLATED A1C: CPT

## 2018-04-12 PROCEDURE — 87086 URINE CULTURE/COLONY COUNT: CPT

## 2018-04-12 PROCEDURE — 80053 COMPREHEN METABOLIC PANEL: CPT

## 2018-04-12 PROCEDURE — 84443 ASSAY THYROID STIM HORMONE: CPT

## 2018-04-12 PROCEDURE — 80061 LIPID PANEL: CPT

## 2018-04-12 PROCEDURE — 84439 ASSAY OF FREE THYROXINE: CPT

## 2018-04-12 PROCEDURE — 85025 COMPLETE CBC W/AUTO DIFF WBC: CPT

## 2018-06-26 RX ORDER — RANITIDINE 150 MG/1
TABLET ORAL
Qty: 60 TABLET | Refills: 0 | OUTPATIENT
Start: 2018-06-26

## 2018-06-28 RX ORDER — RANITIDINE 150 MG/1
TABLET ORAL
Qty: 60 TABLET | Refills: 0 | OUTPATIENT
Start: 2018-06-28

## 2024-02-23 NOTE — PROGRESS NOTES
Chief Complaint   Patient presents with   • Routine Prenatal Visit     patient advised needs refill on Phenergan, still having a lot of nausea/vomiting.        HPI: Zulma is a  currently at 10w5d who today reports the following:  Contractions - No; Leaking - No; Vaginal bleeding -  No; Heartburn - YES.  Pt had previously been on lisinopril at beginning of pregnancy; pt now off.  Pt still on zanaflex, seroquel, phenergan, clonidine, subutex  ROS:   GI:   Nausea - YES; Constipation - No; Diarrhea - No.   Neuro:  Headache - No; Visual disturbances - No.    EXAM:   Vitals:  See prenatal flowsheet   Abdomen:   See prenatal flowsheet   Pelvic:  See prenatal flowsheet   Urine:  See prenatal flowsheet    Lab Results   Component Value Date    ABO A 2017    RH Negative 2017    ABSCRN Negative 2017       MDM:  Impression: Supervision of pregnancy  Chronic HTN in pregnancy  GERD in pregnancy  Bipolar disorder/panic disorder   H/O substance abuse on subutex  Nausea and vomiting in pregnancy  Hepatitis C   Tests done today: Rx Phenergan given  Rx PNVS given   Topics discussed: Discussed risk of medications taken first trimester.  Plan scan next visit to confirm dates.  Consider referral to [facility] for anatomic survey.   Discussed implications of hepatitis C and vertical transmission.   Tests next visit: U/S for dating     This note was electronically signed.  Preethi Land M.D.     natalie